# Patient Record
Sex: FEMALE | Race: WHITE | URBAN - METROPOLITAN AREA
[De-identification: names, ages, dates, MRNs, and addresses within clinical notes are randomized per-mention and may not be internally consistent; named-entity substitution may affect disease eponyms.]

---

## 2022-10-12 ENCOUNTER — OFFICE VISIT (OUTPATIENT)
Dept: DERMATOLOGY | Age: 57
End: 2022-10-12
Payer: MEDICARE

## 2022-10-12 VITALS
HEART RATE: 105 BPM | TEMPERATURE: 97.6 F | BODY MASS INDEX: 27.16 KG/M2 | SYSTOLIC BLOOD PRESSURE: 125 MMHG | WEIGHT: 163 LBS | OXYGEN SATURATION: 95 % | DIASTOLIC BLOOD PRESSURE: 89 MMHG | HEIGHT: 65 IN

## 2022-10-12 DIAGNOSIS — L65.8 FEMALE PATTERN ALOPECIA: ICD-10-CM

## 2022-10-12 DIAGNOSIS — L68.0 HIRSUTISM: Primary | ICD-10-CM

## 2022-10-12 DIAGNOSIS — Z79.899 ON POTASSIUM SPARING DIURETIC THERAPY: ICD-10-CM

## 2022-10-12 DIAGNOSIS — L82.1 SEBORRHEIC KERATOSIS: ICD-10-CM

## 2022-10-12 DIAGNOSIS — D22.9 MULTIPLE NEVI: ICD-10-CM

## 2022-10-12 PROCEDURE — 1036F TOBACCO NON-USER: CPT | Performed by: DERMATOLOGY

## 2022-10-12 PROCEDURE — G8427 DOCREV CUR MEDS BY ELIG CLIN: HCPCS | Performed by: DERMATOLOGY

## 2022-10-12 PROCEDURE — 3017F COLORECTAL CA SCREEN DOC REV: CPT | Performed by: DERMATOLOGY

## 2022-10-12 PROCEDURE — 99204 OFFICE O/P NEW MOD 45 MIN: CPT | Performed by: DERMATOLOGY

## 2022-10-12 PROCEDURE — G8484 FLU IMMUNIZE NO ADMIN: HCPCS | Performed by: DERMATOLOGY

## 2022-10-12 PROCEDURE — G8419 CALC BMI OUT NRM PARAM NOF/U: HCPCS | Performed by: DERMATOLOGY

## 2022-10-12 RX ORDER — INSULIN LISPRO 100 [IU]/ML
INJECTION, SOLUTION INTRAVENOUS; SUBCUTANEOUS
COMMUNITY

## 2022-10-12 RX ORDER — IBUPROFEN 800 MG/1
TABLET ORAL
COMMUNITY

## 2022-10-12 RX ORDER — INSULIN GLARGINE 100 [IU]/ML
INJECTION, SOLUTION SUBCUTANEOUS
COMMUNITY
Start: 2022-08-25

## 2022-10-12 RX ORDER — MULTIVITAMIN WITH IRON
8000 TABLET ORAL DAILY
COMMUNITY

## 2022-10-12 RX ORDER — SPIRONOLACTONE 50 MG/1
TABLET, FILM COATED ORAL
Qty: 60 TABLET | Refills: 0 | Status: SHIPPED | OUTPATIENT
Start: 2022-10-12

## 2022-10-12 RX ORDER — FAMOTIDINE 20 MG/1
TABLET, FILM COATED ORAL
COMMUNITY

## 2022-10-12 RX ORDER — ALPRAZOLAM 0.25 MG/1
TABLET ORAL
COMMUNITY
Start: 2022-08-15

## 2022-10-12 RX ORDER — QUETIAPINE FUMARATE 25 MG/1
TABLET, FILM COATED ORAL
COMMUNITY

## 2022-10-12 RX ORDER — DULOXETIN HYDROCHLORIDE 60 MG/1
CAPSULE, DELAYED RELEASE ORAL
COMMUNITY
Start: 2022-10-06

## 2022-10-12 RX ORDER — DULAGLUTIDE 3 MG/.5ML
INJECTION, SOLUTION SUBCUTANEOUS
COMMUNITY
Start: 2022-10-10

## 2022-10-12 RX ORDER — ASPIRIN 81 MG/1
81 TABLET ORAL DAILY
COMMUNITY

## 2022-10-12 NOTE — PROGRESS NOTES
Dermatology Patient Note  Deepak  [address]  [facility]  Dept: [phone]  Dept Fax: [phone]      VISITDATE: 10/12/2022   REFERRING PROVIDER: No ref. provider found      Crispin Ying is a 59 y.o. female  who presents today in the office for:    New Patient (New pt here for a FBSC-used to see . States that she has a hx of skin cancer right shoulder and had it shaved off. Pt states she also has hirtuism. She has hair growth on the chin area and thinning of the hair. She states that she would like to try spirololactolone )      HISTORY OF PRESENT ILLNESS:  As above. Patient has history of lesion removed on shoulder, thinks maybe pre-melanoma (atypical nevus?) She states she had to have two layers of skin removed. Patient reports that she saw Dr. Jelena Figueroa for removal. She states she had a mole removed between her toes but the biopsy returned benign. Hirsutism - has been a problem for 30+ years. She states her hormones have been checked for many years even after her hysterectomy. She reports she just found out the name of her condition from a TV show (Bad Hair Day) and that she has never had treatment for it. She states her mother and grandmother had whiskers but not to patient's extent. Has not tried spironolactone. She denies problems with blood pressure and kidney problems. She had laser hair removal and had 2-3 sessions. She reports that the area just blistered and did not improve her hirsutism. She has also had an electrical therapy performed which did not work. Also tried an at home laser therapy which did not work. Patient states she is type 1.5 diabetic and whenever she goes to the ER for diabetes her potassium is always low. She is following with an endocrinologist.     MEDICAL PROBLEMS:  There are no problems to display for this patient.       CURRENT MEDICATIONS:   Current Outpatient Medications   Medication Sig Dispense Refill    ALPRAZolam (XANAX) 0.25 MG tablet       aspirin 81 MG EC tablet Take 81 mg by mouth daily      vitamin D (CHOLECALCIFEROL) 25657 UNIT CAPS cholecalciferol (vitamin D3) 1,250 mcg (50,000 unit) capsule      ciclopirox (LOPROX) 0.77 % cream Loprox 0.77 % External Cream  APPLY THIN FILM TO AFFECTED AREA(S) ONCE DAILY. prn   Refills: 0  Active      Cyanocobalamin 1000 MCG SUBL Place 1,000 mcg under the tongue daily      TRULICITY 3 KI/3.8JH SOPN       DULoxetine (CYMBALTA) 60 MG extended release capsule       insulin lispro, 1 Unit Dial, (HUMALOG/ADMELOG) 100 UNIT/ML SOPN insulin lispro (U-100) 100 unit/mL subcutaneous pen      LANTUS SOLOSTAR 100 UNIT/ML injection pen       famotidine (PEPCID) 20 MG tablet famotidine 20 mg tablet      ibuprofen (ADVIL;MOTRIN) 800 MG tablet ibuprofen 800 mg tablet      QUEtiapine (SEROQUEL) 25 MG tablet quetiapine 25 mg tablet      Vitamin A 2400 MCG (8000 UT) CAPS Take 8,000 Units by mouth daily       No current facility-administered medications for this visit. ALLERGIES:   Allergies   Allergen Reactions    Dust Mite Extract      This is dust mites - updating for system screening from allergy originally recorded 8-4-2016    Lisinopril Cough       SOCIAL HISTORY:  Social History     Tobacco Use    Smoking status: Never    Smokeless tobacco: Never   Substance Use Topics    Alcohol use: Yes     Comment: on occ       Pertinent ROS:  Review of Systems  Skin: Denies any new changing, growing or bleeding lesions or rashes except as described in the HPI   Constitutional: Denies fevers, chills, and malaise.     PHYSICAL EXAM:   /89   Pulse (!) 105   Temp 97.6 °F (36.4 °C)   Ht 5' 5\" (1.651 m)   Wt 163 lb (73.9 kg)   SpO2 95%   BMI 27.12 kg/m²     The patient is generally well appearing, well nourished, alert and conversational. Affect is normal.    Cutaneous Exam:  Physical Exam  Total body skin exam excluding external genitalia: head/face, neck, both arms, chest, back, abdomen, both legs, buttocks, digits and/or nails, was examined. Genital exam was deferred as patient denied having any lesions in this area. Complete visualization of scalp may be limited by hair density, length, and/or style    Facial covering was removed during examination. Diagnoses/exam findings/medical history pertinent to this visit are listed below:    Assessment:   Diagnosis Orders   1. Hirsutism        2. Seborrheic keratosis        3. Female pattern alopecia             Plan:  Hirsutism  - chronic illness with progression and/or exacerbation   - discussed diagnosis, etiology, natural course, and treatment options   - start spironolactone 50 mg daily for 1 week, then 100 mg for rest of month  Discussed spironolactone with patient. The patient was informed of common side effects such as increased urination/urinary frequency, menstrual irregularities with mid-cycle spotting, breast tenderness, decreased libido, fatigue, headache, and dizziness. Patient informed to stop taking medication and to immediately report any new onset muscle cramps or weakness, or palpitations. Patient informed that pregnancy needs to be avoided while on this medication. Discussed risk of elevated potassium and the need to stay away from potassium supplements while on the medication. - check potassium in 2 weeks (after 1 week on 50 mg and 1 week on 100 mg). Potassium 9/28/2022 is normal  - counseled patient to keep diabetes under control to protect kidneys  - patient plans to use at home laser hair removal while on spironolactone    Female pattern alopecia  - spironolactone as above    Seborrheic keratoses of inframammary folds  - reassurance and education     History of skin cancer  - request records from Dr. Terry Morrison    [facility] 6 months    No future appointments.       Patient Instructions   - start spironolactone 50 mg daily for 1 week, then 100 mg for rest of month  - check potassium today in 2 weeks (after 1 week on 50 mg and 1 week on 100 mg)  Common side effects of spironolactone include increased urination, irregular periods with mid-cycle spotting, breast tenderness, decreased sex drive, fatigue, headache, and dizziness. Stop taking medication and to immediately report any new onset muscle cramps or weakness, or palpitations. Pregnancy needs to be avoided by use of birth control in order to prevent feminization of a male fetus. Stay away from potassium supplements while on the medication as there is a risk of high potassium levels. Thomas Lieberman, personally scribed the services dictated to me by Dr. Shana Garcia in this documentation. I, Dr. Shana Garcia, personally performed the services described in this documentation, as scribed by Vern Rodarte in my presence, and it is both accurate and complete.     Electronically signed by Criss Brown MD on 10/12/2022 at 9:46 PM

## 2022-10-12 NOTE — PATIENT INSTRUCTIONS
- start spironolactone 50 mg daily for 1 week, then 100 mg for rest of month  - check potassium today in 2 weeks (after 1 week on 50 mg and 1 week on 100 mg)  Common side effects of spironolactone include increased urination, irregular periods with mid-cycle spotting, breast tenderness, decreased sex drive, fatigue, headache, and dizziness. Stop taking medication and to immediately report any new onset muscle cramps or weakness, or palpitations. Pregnancy needs to be avoided by use of birth control in order to prevent feminization of a male fetus. Stay away from potassium supplements while on the medication as there is a risk of high potassium levels.

## 2022-10-21 ENCOUNTER — TELEPHONE (OUTPATIENT)
Dept: DERMATOLOGY | Age: 57
End: 2022-10-21

## 2022-10-21 NOTE — TELEPHONE ENCOUNTER
Patient needs instructions on how to take Aldactone.   She wants to know, should she take 2 pills at the same time or should she take one pill in the AM and one pill in the PM

## 2022-11-25 DIAGNOSIS — L68.0 HIRSUTISM: ICD-10-CM

## 2022-11-25 DIAGNOSIS — L65.8 FEMALE PATTERN ALOPECIA: ICD-10-CM

## 2022-11-28 RX ORDER — SPIRONOLACTONE 50 MG/1
TABLET, FILM COATED ORAL
Qty: 60 TABLET | Refills: 0 | Status: SHIPPED | OUTPATIENT
Start: 2022-11-28

## 2023-01-15 DIAGNOSIS — L68.0 HIRSUTISM: ICD-10-CM

## 2023-01-15 DIAGNOSIS — L65.8 FEMALE PATTERN ALOPECIA: ICD-10-CM

## 2023-01-16 RX ORDER — SPIRONOLACTONE 50 MG/1
TABLET, FILM COATED ORAL
Qty: 60 TABLET | Refills: 2 | Status: SHIPPED | OUTPATIENT
Start: 2023-01-16

## 2023-05-09 DIAGNOSIS — L68.0 HIRSUTISM: ICD-10-CM

## 2023-05-09 DIAGNOSIS — L65.8 FEMALE PATTERN ALOPECIA: ICD-10-CM

## 2023-05-09 RX ORDER — SPIRONOLACTONE 50 MG/1
TABLET, FILM COATED ORAL
Qty: 90 TABLET | Refills: 0 | OUTPATIENT
Start: 2023-05-09

## 2023-05-24 DIAGNOSIS — L65.8 FEMALE PATTERN ALOPECIA: ICD-10-CM

## 2023-05-24 DIAGNOSIS — L68.0 HIRSUTISM: ICD-10-CM

## 2023-05-25 ENCOUNTER — HOSPITAL ENCOUNTER (OUTPATIENT)
Age: 58
Setting detail: SPECIMEN
Discharge: HOME | End: 2023-05-25

## 2023-05-25 DIAGNOSIS — Z79.899 ON POTASSIUM SPARING DIURETIC THERAPY: ICD-10-CM

## 2023-05-25 LAB
ANION GAP SERPL CALCULATED.3IONS-SCNC: 14 MMOL/L (ref 9–17)
BUN SERPL-MCNC: 10 MG/DL (ref 6–20)
CALCIUM SERPL-MCNC: 10 MG/DL (ref 8.6–10.4)
CHLORIDE SERPL-SCNC: 102 MMOL/L (ref 98–107)
CO2 SERPL-SCNC: 24 MMOL/L (ref 20–31)
CREAT SERPL-MCNC: 0.82 MG/DL (ref 0.5–0.9)
GFR SERPL CREATININE-BSD FRML MDRD: >60 ML/MIN/1.73M2
GLUCOSE SERPL-MCNC: 203 MG/DL (ref 70–99)
POTASSIUM SERPL-SCNC: 4 MMOL/L (ref 3.7–5.3)
SODIUM SERPL-SCNC: 140 MMOL/L (ref 135–144)

## 2023-05-25 RX ORDER — SPIRONOLACTONE 50 MG/1
TABLET, FILM COATED ORAL
Qty: 90 TABLET | Refills: 0 | OUTPATIENT
Start: 2023-05-25

## 2023-05-26 DIAGNOSIS — L65.8 FEMALE PATTERN ALOPECIA: ICD-10-CM

## 2023-05-26 DIAGNOSIS — L68.0 HIRSUTISM: ICD-10-CM

## 2023-05-26 RX ORDER — SPIRONOLACTONE 50 MG/1
TABLET, FILM COATED ORAL
Qty: 90 TABLET | Refills: 5 | Status: SHIPPED | OUTPATIENT
Start: 2023-05-26

## 2023-10-12 ENCOUNTER — OFFICE VISIT (OUTPATIENT)
Dept: DERMATOLOGY | Age: 58
End: 2023-10-12
Payer: MEDICARE

## 2023-10-12 VITALS
HEART RATE: 114 BPM | BODY MASS INDEX: 27.16 KG/M2 | WEIGHT: 163.2 LBS | SYSTOLIC BLOOD PRESSURE: 113 MMHG | DIASTOLIC BLOOD PRESSURE: 81 MMHG | TEMPERATURE: 97.2 F | OXYGEN SATURATION: 97 %

## 2023-10-12 DIAGNOSIS — L29.9 PRURITUS: ICD-10-CM

## 2023-10-12 DIAGNOSIS — L65.9 ALOPECIA: ICD-10-CM

## 2023-10-12 DIAGNOSIS — D48.5 NEOPLASM OF UNCERTAIN BEHAVIOR OF SCALP: ICD-10-CM

## 2023-10-12 DIAGNOSIS — L81.4 SOLAR LENTIGO: ICD-10-CM

## 2023-10-12 DIAGNOSIS — L65.8 FEMALE PATTERN ALOPECIA: ICD-10-CM

## 2023-10-12 DIAGNOSIS — L21.9 SEBORRHEIC DERMATITIS: ICD-10-CM

## 2023-10-12 DIAGNOSIS — L68.0 HIRSUTISM: Primary | ICD-10-CM

## 2023-10-12 PROCEDURE — 11104 PUNCH BX SKIN SINGLE LESION: CPT | Performed by: DERMATOLOGY

## 2023-10-12 PROCEDURE — 99212 OFFICE O/P EST SF 10 MIN: CPT | Performed by: DERMATOLOGY

## 2023-10-12 RX ORDER — SPIRONOLACTONE 50 MG/1
TABLET, FILM COATED ORAL
Qty: 90 TABLET | Refills: 2 | Status: SHIPPED | OUTPATIENT
Start: 2023-10-12

## 2023-10-12 RX ORDER — KETOCONAZOLE 20 MG/ML
SHAMPOO TOPICAL
Qty: 120 ML | Refills: 2 | Status: SHIPPED | OUTPATIENT
Start: 2023-10-12

## 2023-10-12 RX ORDER — LIDOCAINE HYDROCHLORIDE AND EPINEPHRINE 10; 10 MG/ML; UG/ML
0.9 INJECTION, SOLUTION INFILTRATION; PERINEURAL ONCE
Status: SHIPPED | OUTPATIENT
Start: 2023-10-12

## 2023-10-12 RX ORDER — LAMOTRIGINE 25 MG/1
TABLET ORAL
COMMUNITY
Start: 2023-09-15

## 2023-10-12 RX ORDER — ATORVASTATIN CALCIUM 40 MG/1
TABLET, FILM COATED ORAL
COMMUNITY
Start: 2023-08-03

## 2023-10-12 NOTE — PROGRESS NOTES
by a pathologist.  When a biopsy is done, there is a small wound site that requires proper care to prevent infection and scarring. Some biopsies require sutures and their removal.    How to Care for Biopsy Wound    A.  Leave band-aid or dressing on for 24 hours. B. Wash two times a day with soap and water. C.  Let the wound air dry, then apply Vaseline ointment and cover with a Band-Aid       unless otherwise instructed by your provider. D. If there is slight discomfort, you may give acetaminophen or ibuprofen. When To Call the Doctor    Call the Dermatology Clinic or your doctor if any of the following occur:    A. Redness and swelling  B. Tenderness and warm to touch  C.  Drainage from wound  D. Fever    Biopsy Results    Biopsy results are usually available in 1-2 weeks. We provide biopsy results in letters or via [email or web address] for benign results or we will call for any concerning results. If you have not heard from our staff please call the office within 2 weeks. Please call our office with any concerns at [phone]. Julio Rojo, personally scribed the services dictated to me by Dr. Norma Valerio in this documentation. I, Dr. Norma Valerio, personally performed the services described in this documentation, as scribed by Tab Leon in my presence, and it is both accurate and complete.

## 2023-10-12 NOTE — PATIENT INSTRUCTIONS

## 2023-10-13 ENCOUNTER — HOSPITAL ENCOUNTER (OUTPATIENT)
Age: 58
Setting detail: SPECIMEN
Discharge: HOME | End: 2023-10-13

## 2023-10-16 ENCOUNTER — NURSE ONLY (OUTPATIENT)
Dept: LAB | Age: 58
End: 2023-10-16

## 2023-10-19 ENCOUNTER — NURSE ONLY (OUTPATIENT)
Dept: DERMATOLOGY | Age: 58
End: 2023-10-19

## 2023-10-19 VITALS — TEMPERATURE: 97.2 F | BODY MASS INDEX: 28 KG/M2 | WEIGHT: 164 LBS | HEIGHT: 64 IN

## 2023-10-19 DIAGNOSIS — L29.9 PRURITUS: Primary | ICD-10-CM

## 2023-10-19 DIAGNOSIS — Z48.02 ENCOUNTER FOR REMOVAL OF SUTURES: Primary | ICD-10-CM

## 2023-10-19 PROCEDURE — 99024 POSTOP FOLLOW-UP VISIT: CPT | Performed by: DERMATOLOGY

## 2023-10-19 RX ORDER — FLUOCINONIDE TOPICAL SOLUTION USP, 0.05% 0.5 MG/ML
SOLUTION TOPICAL
Qty: 60 ML | Refills: 2 | Status: SHIPPED | OUTPATIENT
Start: 2023-10-19

## 2023-10-19 NOTE — PROGRESS NOTES
Bryce Garcia presented for suture removal on the scalp. The biopsy site was well healed. The suture was removed and a band-aid applied. The patient left in good condition.

## 2024-02-19 ENCOUNTER — HOSPITAL ENCOUNTER (OUTPATIENT)
Age: 59
Setting detail: SPECIMEN
Discharge: HOME | End: 2024-02-19

## 2024-02-19 ENCOUNTER — OFFICE VISIT (OUTPATIENT)
Dept: DERMATOLOGY | Age: 59
End: 2024-02-19
Payer: MEDICARE

## 2024-02-19 VITALS
DIASTOLIC BLOOD PRESSURE: 87 MMHG | HEIGHT: 64 IN | SYSTOLIC BLOOD PRESSURE: 129 MMHG | HEART RATE: 104 BPM | OXYGEN SATURATION: 98 % | TEMPERATURE: 98.3 F | BODY MASS INDEX: 27.66 KG/M2 | WEIGHT: 162 LBS

## 2024-02-19 DIAGNOSIS — L21.9 SEBORRHEIC DERMATITIS, UNSPECIFIED: ICD-10-CM

## 2024-02-19 DIAGNOSIS — L29.8 OTHER PRURITUS: ICD-10-CM

## 2024-02-19 DIAGNOSIS — L21.9 SEBORRHEIC DERMATITIS: ICD-10-CM

## 2024-02-19 DIAGNOSIS — Z01.89 ENCOUNTER FOR OTHER SPECIFIED SPECIAL EXAMINATIONS: ICD-10-CM

## 2024-02-19 DIAGNOSIS — Z79.899 ON POTASSIUM SPARING DIURETIC THERAPY: ICD-10-CM

## 2024-02-19 DIAGNOSIS — L82.1 SEBORRHEIC KERATOSES: ICD-10-CM

## 2024-02-19 DIAGNOSIS — R20.8 SKIN PAIN: ICD-10-CM

## 2024-02-19 DIAGNOSIS — L64.9 ANDROGENETIC ALOPECIA: Primary | ICD-10-CM

## 2024-02-19 DIAGNOSIS — L28.2 OTHER PRURIGO: ICD-10-CM

## 2024-02-19 DIAGNOSIS — L57.0 ACTINIC KERATOSES: ICD-10-CM

## 2024-02-19 DIAGNOSIS — L68.0 HIRSUTISM: ICD-10-CM

## 2024-02-19 LAB
ANION GAP SERPL CALCULATED.3IONS-SCNC: 15 MMOL/L (ref 9–16)
BUN SERPL-MCNC: 19 MG/DL (ref 8–23)
CALCIUM SERPL-MCNC: 10.1 MG/DL (ref 8.6–10.4)
CHLORIDE SERPL-SCNC: 101 MMOL/L (ref 98–107)
CO2 SERPL-SCNC: 20 MMOL/L (ref 20–31)
CREAT SERPL-MCNC: 1 MG/DL (ref 0.5–0.9)
GFR SERPL CREATININE-BSD FRML MDRD: >60 ML/MIN/1.73M2
GLUCOSE SERPL-MCNC: 211 MG/DL (ref 74–99)
POTASSIUM SERPL-SCNC: 4.7 MMOL/L (ref 3.7–5.3)
SODIUM SERPL-SCNC: 136 MMOL/L (ref 136–145)

## 2024-02-19 PROCEDURE — 99214 OFFICE O/P EST MOD 30 MIN: CPT | Performed by: DERMATOLOGY

## 2024-02-19 PROCEDURE — 17000 DESTRUCT PREMALG LESION: CPT | Performed by: DERMATOLOGY

## 2024-02-19 RX ORDER — MINOXIDIL 2 %
SOLUTION, NON-ORAL TOPICAL
Qty: 60 G | Refills: 3 | Status: CANCELLED | OUTPATIENT
Start: 2024-02-19

## 2024-02-19 NOTE — PATIENT INSTRUCTIONS
- start Minoxidil 5% 1-2 daily. Counseled appropriate use. Will take up to 6 months to see results. May have initial telogen effluvium. Results will be reversed if use is discontinued. Switch to women's strength (2%) if increased facial hair growth noted.  - complete ordered labs   - continue spironolactone 150 mg QD  - continue fluocinonide solution daily to pruritic areas   - continue hydrocortisone 2.5% cream for facial pruritus     How To Use Minoxidil (Rogaine)  Use men’s strength: 5%   Once or twice/day  Much better to use regularly once a day than inconsistently every other day   Only apply medication where you actually want your hair to grow  Can cause hair to grow on face, neck, hands, etc. if not washed off  Tips for application: wear gloves or wash hands with soap and water immediately     Important things to know:  Need to use for ~8-12 months to see effects  Medication must be used consistently and continuously for a sustained benefit   May notice some hair loss when starting medication   This is due to the medication converting the hair from a “weaker” hair into a “stronger” hair  Will notice hair loss when medication is stopped   Will lose only newly grown hair gained from using medicine  Will NOT “speed up” hair loss, normal loss starts again    Cheapest place to buy Minoxidil is [facility]   ~$50 = 6 month supply       Sun Protection     There are two types of sun rays that are harmful to the skin.  UVA rays cause skin aging and skin cancer, such as melanoma.  UVB rays cause sunburns, cataracts, and also contribute to skin cancer.    The American-Academy of Dermatology recommends that children and adults wear a broad spectrum, waterproof sunscreen with a Sun Protection Factor (SPF) of 30 or higher.  It is important to check the ingredient label to be sure the sunscreen will protect the skin from both UVA and UVB sunrays.  Your sunscreen should contain at least one of the following ingredients: titanium

## 2024-02-19 NOTE — PROGRESS NOTES
Dermatology Patient Note  [facility], [facility] DERMATOLOGY  [facility]  [address]  [facility] [address]  Dept: [phone]  Dept Fax: [phone]      VISITDATE: 2/19/2024   REFERRING PROVIDER: No ref. provider found      Caity Jones is a 58 y.o. female  who presents today in the office for:    Other (Pt here for an alopecia follow up- states she stopped the dustaeride a week after she was prescribed it back in December due to having an allergic reaction to it. States broke out in a all over rash. She is taking the barrett, lidex sol-noticing an improvement in the hair growth. States in the last two months she's had a \"weird skin sensation like she has a sun burn\" and can't stand her clothes touching her skin. Started around the time she took the dutasteride. )      HISTORY OF PRESENT ILLNESS:  Patient presents for 2 month hirsutism and alopecia follow up. At  she was continued on spironolactone, fluocinonide solution, and hydrocortisone cream and started on Dutasteride 0.5 tiw.     At today's appointment the patient reports she had to stop the Dutasteride after 1 week due to an allergic rxn causing widespread hives. She has some new growth and increased thickness on the crown. She continues to be compliant with spironolactone, ketoconazole, lidex solution and hydrocortisone. She has never used rogaine due to hirsutism.     During the past 2 months she has had an odd skin sensitivity she describes to be like a sun burn. It Is on her upper ext, back, and near the bilateral ankles. She does not like the feeling of clothing touching her skin. It is different compared to the pruritus she has felt on the scalp and face. She has diabetes and is currently better controlled than she was a year ago. The patient takes lamictal for bipolar disorder (~2 years) and duloxetine for depression. She was on Gabapentin in the past but stopped due to fatigue.      MEDICAL

## 2024-04-02 DIAGNOSIS — L65.8 FEMALE PATTERN ALOPECIA: ICD-10-CM

## 2024-04-02 DIAGNOSIS — L68.0 HIRSUTISM: ICD-10-CM

## 2024-04-03 RX ORDER — SPIRONOLACTONE 50 MG/1
TABLET, FILM COATED ORAL
Qty: 90 TABLET | Refills: 2 | Status: SHIPPED | OUTPATIENT
Start: 2024-04-03

## 2024-04-10 ENCOUNTER — HOSPITAL ENCOUNTER (OUTPATIENT)
Age: 59
Setting detail: SPECIMEN
Discharge: HOME | End: 2024-04-10

## 2024-04-10 DIAGNOSIS — L21.9 SEBORRHEIC DERMATITIS, UNSPECIFIED: ICD-10-CM

## 2024-04-10 DIAGNOSIS — Z01.89 ENCOUNTER FOR OTHER SPECIFIED SPECIAL EXAMINATIONS: ICD-10-CM

## 2024-04-10 DIAGNOSIS — L29.8 OTHER PRURITUS: ICD-10-CM

## 2024-04-10 DIAGNOSIS — L28.2 OTHER PRURIGO: ICD-10-CM

## 2024-04-10 DIAGNOSIS — R20.8 SKIN PAIN: ICD-10-CM

## 2024-04-10 LAB
BASOPHILS # BLD: 0.08 K/UL (ref 0–0.2)
BASOPHILS NFR BLD: 1 % (ref 0–2)
C PEPTIDE SERPL-MCNC: 1.3 NG/ML (ref 1.1–4.4)
EOSINOPHIL # BLD: 0.25 K/UL (ref 0–0.44)
EOSINOPHILS RELATIVE PERCENT: 3 % (ref 1–4)
ERYTHROCYTE [DISTWIDTH] IN BLOOD BY AUTOMATED COUNT: 12.9 % (ref 11.8–14.4)
FERRITIN SERPL-MCNC: 148 NG/ML (ref 13–150)
FOLATE SERPL-MCNC: 11.8 NG/ML (ref 4.8–24.2)
GLUCOSE P FAST SERPL-MCNC: 170 MG/DL (ref 74–99)
HAV IGM SERPL QL IA: NONREACTIVE
HBV CORE IGM SERPL QL IA: NONREACTIVE
HBV SURFACE AG SERPL QL IA: NONREACTIVE
HCT VFR BLD AUTO: 38.5 % (ref 36.3–47.1)
HCV AB SERPL QL IA: NONREACTIVE
HGB BLD-MCNC: 13 G/DL (ref 11.9–15.1)
HIV 1+2 AB+HIV1 P24 AG SERPL QL IA: NONREACTIVE
IMM GRANULOCYTES # BLD AUTO: <0.03 K/UL (ref 0–0.3)
IMM GRANULOCYTES NFR BLD: 0 %
IRON SATN MFR SERPL: 21 % (ref 20–55)
IRON SERPL-MCNC: 68 UG/DL (ref 37–145)
LYMPHOCYTES NFR BLD: 2.49 K/UL (ref 1.1–3.7)
LYMPHOCYTES RELATIVE PERCENT: 31 % (ref 24–43)
MCH RBC QN AUTO: 29.8 PG (ref 25.2–33.5)
MCHC RBC AUTO-ENTMCNC: 33.8 G/DL (ref 28.4–34.8)
MCV RBC AUTO: 88.3 FL (ref 82.6–102.9)
MONOCYTES NFR BLD: 0.55 K/UL (ref 0.1–1.2)
MONOCYTES NFR BLD: 7 % (ref 3–12)
NEUTROPHILS NFR BLD: 58 % (ref 36–65)
NEUTS SEG NFR BLD: 4.77 K/UL (ref 1.5–8.1)
NRBC BLD-RTO: 0 PER 100 WBC
PLATELET # BLD AUTO: 356 K/UL (ref 138–453)
PMV BLD AUTO: 10.1 FL (ref 8.1–13.5)
RBC # BLD AUTO: 4.36 M/UL (ref 3.95–5.11)
TIBC SERPL-MCNC: 320 UG/DL (ref 250–450)
TSH SERPL DL<=0.05 MIU/L-ACNC: 1.51 UIU/ML (ref 0.27–4.2)
UNSATURATED IRON BINDING CAPACITY: 252 UG/DL (ref 112–347)
VIT B12 SERPL-MCNC: 649 PG/ML (ref 232–1245)
WBC OTHER # BLD: 8.2 K/UL (ref 3.5–11.3)

## 2024-04-11 LAB
ALBUMIN SERPL-MCNC: 4.5 G/DL (ref 3.5–5.2)
ALBUMIN/GLOB SERPL: NORMAL {RATIO} (ref 1–2.5)
ALP SERPL-CCNC: 85 U/L (ref 35–104)
ALT SERPL-CCNC: 21 U/L (ref 10–35)
AST SERPL-CCNC: 22 U/L (ref 10–35)
BILIRUB DIRECT SERPL-MCNC: <0.2 MG/DL (ref 0–0.3)
BILIRUB INDIRECT SERPL-MCNC: 0.2 MG/DL (ref 0–1)
BILIRUB SERPL-MCNC: 0.3 MG/DL (ref 0–1.2)
GLOBULIN SER CALC-MCNC: NORMAL G/DL
PROT SERPL-MCNC: 6.6 G/DL (ref 6.6–8.7)

## 2024-04-12 LAB
ALBUMIN PERCENT: 66 % (ref 45–65)
ALBUMIN SERPL-MCNC: 4.3 G/DL (ref 3.2–5.2)
ALPHA 2 PERCENT: 12 % (ref 6–13)
ALPHA1 GLOB SERPL ELPH-MCNC: 0.2 G/DL (ref 0.1–0.4)
ALPHA1 GLOB SERPL ELPH-MCNC: 3 % (ref 3–6)
ALPHA2 GLOB SERPL ELPH-MCNC: 0.8 G/DL (ref 0.5–0.9)
ANA SER QL IA: NEGATIVE
B-GLOBULIN SERPL ELPH-MCNC: 0.8 G/DL (ref 0.7–1.4)
B-GLOBULIN SERPL ELPH-MCNC: 12 % (ref 11–19)
DSDNA IGG SER QL IA: <0.5 IU/ML
GAMMA GLOB SERPL ELPH-MCNC: 0.5 G/DL (ref 0.5–1.5)
GAMMA GLOBULIN %: 8 % (ref 9–20)
INTERPRETATION SERPL IFE-IMP: NORMAL
NUCLEAR IGG SER IA-RTO: <0.1 U/ML
PATH REV: NORMAL
PATHOLOGIST: ABNORMAL
PROT PATTERN SERPL ELPH-IMP: ABNORMAL
PROT SERPL-MCNC: 6.6 G/DL (ref 6.6–8.7)
TOTAL PROT. SUM,%: 101 % (ref 98–102)
TOTAL PROT. SUM: 6.6 G/DL (ref 6.3–8.2)

## 2024-06-10 DIAGNOSIS — L21.9 SEBORRHEIC DERMATITIS: ICD-10-CM

## 2024-06-11 RX ORDER — KETOCONAZOLE 20 MG/ML
SHAMPOO TOPICAL
Qty: 120 ML | Refills: 2 | Status: SHIPPED | OUTPATIENT
Start: 2024-06-11

## 2024-08-08 NOTE — PROGRESS NOTES
Dermatology Patient Note  [facility], [facility] DERMATOLOGY  formerly [facility]  [address]  [facility] [address]  Dept: [phone]  Dept Fax: [phone]      VISITDATE: 8/19/2024   REFERRING PROVIDER: No ref. provider found      Caity Jones is a 59 y.o. female  who presents today in the office for:    Other (Patient presents today for a six month fbse. No specific concerns today. )      HISTORY OF PRESENT ILLNESS:  Patient presents for 6 month FBSE and alopecia. She states that she had a lesion removed on the right shoulder by Dr. Kennedy in the past. She is unsure of what this lesion was but knows that she had to return to have additional skin removed after the initial biopsy. At  on 2/19/24, 1 AK on the nose was treated with cryotherapy. She was to continue spironolactone 150 mg daily, fluocinonide solution daily, hydrocortisone 2.5% cream daily, and start minoxidil 5% 1-2x daily. Labs were ordered for skin dysesthesia and were normal. Referral to neurology was in reserve.     She denies specific areas of concern today aside from new scaly patches on the face. She notes that her hirsutism has not improved with the current regimen and she has not yet tried the topical minoxidil for androgenetic alopecia. She has been using spironolactone without SE. Last potassium was measured in February 2024. She does not take oral birth control as she underwent a total hysterectomy. She has not tolerated hormonal therapy well in the past.     She has persistent neuropathy/burning of the skin of her arms and back. She has a pump in place which has improved her A1C.     MEDICAL PROBLEMS:  There are no problems to display for this patient.      CURRENT MEDICATIONS:   Current Outpatient Medications   Medication Sig Dispense Refill    ketoconazole (NIZORAL) 2 % shampoo APPLY 3-4 TIMES WEEKLY TO SCALP, LEAVE ON FOR 5 MINUTES PRIOR TO WASHING  mL 2    spironolactone

## 2024-08-19 ENCOUNTER — OFFICE VISIT (OUTPATIENT)
Dept: DERMATOLOGY | Age: 59
End: 2024-08-19
Payer: MEDICARE

## 2024-08-19 VITALS
HEART RATE: 90 BPM | WEIGHT: 163 LBS | DIASTOLIC BLOOD PRESSURE: 81 MMHG | SYSTOLIC BLOOD PRESSURE: 121 MMHG | BODY MASS INDEX: 27.83 KG/M2 | HEIGHT: 64 IN | TEMPERATURE: 97.7 F | OXYGEN SATURATION: 97 %

## 2024-08-19 DIAGNOSIS — L57.8 ACTINIC SKIN DAMAGE: ICD-10-CM

## 2024-08-19 DIAGNOSIS — L82.0 INFLAMED SEBORRHEIC KERATOSIS: ICD-10-CM

## 2024-08-19 DIAGNOSIS — L68.0 HIRSUTISM: ICD-10-CM

## 2024-08-19 DIAGNOSIS — R20.8 SKIN PAIN: ICD-10-CM

## 2024-08-19 DIAGNOSIS — L82.1 SEBORRHEIC KERATOSES: Primary | ICD-10-CM

## 2024-08-19 DIAGNOSIS — L64.9 ANDROGENETIC ALOPECIA: ICD-10-CM

## 2024-08-19 DIAGNOSIS — L29.9 PRURITUS: ICD-10-CM

## 2024-08-19 DIAGNOSIS — Z79.899 ON POTASSIUM SPARING DIURETIC THERAPY: ICD-10-CM

## 2024-08-19 PROCEDURE — 99213 OFFICE O/P EST LOW 20 MIN: CPT | Performed by: DERMATOLOGY

## 2024-08-19 PROCEDURE — 17110 DESTRUCTION B9 LES UP TO 14: CPT | Performed by: DERMATOLOGY

## 2024-08-19 RX ORDER — SPIRONOLACTONE 50 MG/1
TABLET, FILM COATED ORAL
Qty: 90 TABLET | Refills: 2 | Status: SHIPPED | OUTPATIENT
Start: 2024-08-19

## 2024-08-19 RX ORDER — FLUOCINONIDE TOPICAL SOLUTION USP, 0.05% 0.5 MG/ML
SOLUTION TOPICAL
Qty: 60 ML | Refills: 2 | Status: CANCELLED | OUTPATIENT
Start: 2024-08-19

## 2024-08-19 NOTE — PATIENT INSTRUCTIONS
Recommended that she try capsaicin cream on affected areas for nerve itching/pain.     Recommended that she consider use of a diaper rash paste (such as triple paste) on the skin folds for irritation due to sweating.     For alopecia + hirsutism:  - complete labs as ordered   - continue spironolactone 150 mg QD  - continue fluocinonide solution daily to pruritic areas   - continue hydrocortisone 2.5% cream for facial pruritus   - start topical Minoxidil 2% 1-2 daily. Counseled appropriate use. Will take up to 6 months to see results. May have initial telogen effluvium. Results will be reversed if use is discontinued.     Sun Protection     There are two types of sun rays that are harmful to the skin.  UVA rays cause skin aging and skin cancer, such as melanoma.  UVB rays cause sunburns, cataracts, and also contribute to skin cancer.    The American-Academy of Dermatology recommends that children and adults wear a broad spectrum, waterproof sunscreen with a Sun Protection Factor (SPF) of 30 or higher.  It is important to check the ingredient label to be sure the sunscreen will protect the skin from both UVA and UVB sunrays.  Your sunscreen should contain at least one of the following ingredients: titanium dioxide, zinc oxide, or avobenzone.    Sunscreen will not be effective unless it is applied to all exposed skin.  Sunscreens work best if they are applied 30 minutes before sun exposure.  They should be reapplied every 2 hours and after any water exposure.    Sunscreen is not perfect.  It is important to use other methods to protect the skin from sun exposure also.  Wear hats, sunglasses and other sun protective clothing when outdoors.  Stay in the shade during the peak hours of sun exposure between 10 AM and 4 PM.    Cryotherapy    Liquid Nitrogen - \"freeze\" (Cryotherapy)  Your doctor has treated your skin lesions with a very cold substance.  The liquid nitrogen is so cold that it may feel like the skin is burning

## 2024-08-21 ENCOUNTER — HOSPITAL ENCOUNTER (OUTPATIENT)
Age: 59
Setting detail: SPECIMEN
Discharge: HOME | End: 2024-08-21

## 2024-08-21 DIAGNOSIS — Z79.899 ON POTASSIUM SPARING DIURETIC THERAPY: ICD-10-CM

## 2024-08-21 LAB — POTASSIUM SERPL-SCNC: 4.4 MMOL/L (ref 3.7–5.3)

## 2024-11-30 ENCOUNTER — HOSPITAL ENCOUNTER (EMERGENCY)
Facility: CLINIC | Age: 59
Discharge: HOME | End: 2024-11-30
Attending: EMERGENCY MEDICINE
Payer: MEDICARE

## 2024-11-30 VITALS
TEMPERATURE: 98.2 F | OXYGEN SATURATION: 98 % | BODY MASS INDEX: 27.66 KG/M2 | RESPIRATION RATE: 17 BRPM | HEART RATE: 120 BPM | WEIGHT: 162 LBS | HEIGHT: 64 IN | DIASTOLIC BLOOD PRESSURE: 84 MMHG | SYSTOLIC BLOOD PRESSURE: 124 MMHG

## 2024-11-30 DIAGNOSIS — H66.003 NON-RECURRENT ACUTE SUPPURATIVE OTITIS MEDIA OF BOTH EARS WITHOUT SPONTANEOUS RUPTURE OF TYMPANIC MEMBRANES: Primary | ICD-10-CM

## 2024-11-30 DIAGNOSIS — J01.40 ACUTE NON-RECURRENT PANSINUSITIS: ICD-10-CM

## 2024-11-30 LAB
FLUAV AG SPEC QL: NEGATIVE
FLUBV AG SPEC QL: NEGATIVE
SARS-COV-2 RDRP RESP QL NAA+PROBE: NOT DETECTED
SPECIMEN DESCRIPTION: NORMAL
SPECIMEN SOURCE: NORMAL
STREP A, MOLECULAR: NEGATIVE

## 2024-11-30 PROCEDURE — 96372 THER/PROPH/DIAG INJ SC/IM: CPT

## 2024-11-30 PROCEDURE — 87635 SARS-COV-2 COVID-19 AMP PRB: CPT

## 2024-11-30 PROCEDURE — 87804 INFLUENZA ASSAY W/OPTIC: CPT

## 2024-11-30 PROCEDURE — 87651 STREP A DNA AMP PROBE: CPT

## 2024-11-30 PROCEDURE — 6360000002 HC RX W HCPCS

## 2024-11-30 PROCEDURE — 99284 EMERGENCY DEPT VISIT MOD MDM: CPT

## 2024-11-30 RX ORDER — DEXAMETHASONE SODIUM PHOSPHATE 10 MG/ML
10 INJECTION, SOLUTION INTRAMUSCULAR; INTRAVENOUS ONCE
Status: COMPLETED | OUTPATIENT
Start: 2024-11-30 | End: 2024-11-30

## 2024-11-30 RX ORDER — KETOROLAC TROMETHAMINE 30 MG/ML
30 INJECTION, SOLUTION INTRAMUSCULAR; INTRAVENOUS ONCE
Status: COMPLETED | OUTPATIENT
Start: 2024-11-30 | End: 2024-11-30

## 2024-11-30 RX ADMIN — KETOROLAC TROMETHAMINE 30 MG: 30 INJECTION, SOLUTION INTRAMUSCULAR at 13:05

## 2024-11-30 RX ADMIN — DEXAMETHASONE SODIUM PHOSPHATE 10 MG: 10 INJECTION, SOLUTION INTRAMUSCULAR; INTRAVENOUS at 13:01

## 2024-11-30 ASSESSMENT — ENCOUNTER SYMPTOMS
SINUS PAIN: 1
RHINORRHEA: 1
SINUS PRESSURE: 1
SORE THROAT: 1

## 2024-11-30 ASSESSMENT — LIFESTYLE VARIABLES
HOW MANY STANDARD DRINKS CONTAINING ALCOHOL DO YOU HAVE ON A TYPICAL DAY: PATIENT DOES NOT DRINK
HOW OFTEN DO YOU HAVE A DRINK CONTAINING ALCOHOL: NEVER

## 2024-11-30 NOTE — ED PROVIDER NOTES
[facility]  EMERGENCY DEPARTMENT ENCOUNTER      Pt Name: Caity Jones  MRN: [id number]  Birthdate 1965  Date of evaluation: 11/30/2024  Provider: HINA Salas NP  1:49 PM    CHIEF COMPLAINT       Chief Complaint   Patient presents with    Pharyngitis    Ear Pain    Headache         HISTORY OF PRESENT ILLNESS    Caity Jones is a 59 y.o. female who presents to the emergency department     59-year-old female complaining of a sore throat, bilateral ear pain, headache for approximately 3 days.  She also reports a cough, body aches.  She reports a runny nose and severe postnasal drip.  She denies any fevers however she does report chills.  She is currently afebrile.  She is nontoxic, stable, ambulatory.  Denies any chest pain or shortness of breath.  Denies any nausea or vomiting.  Denies any constipation or diarrhea.  Denies any lower urinary tract symptoms.    The history is provided by the patient.       Nursing Notes were reviewed.    REVIEW OF SYSTEMS       Review of Systems   HENT:  Positive for ear pain, postnasal drip, rhinorrhea, sinus pressure, sinus pain and sore throat.        Except as noted above the remainder of the review of systems was reviewed and negative.       PAST MEDICAL HISTORY   No past medical history on file.      SURGICAL HISTORY     No past surgical history on file.      CURRENT MEDICATIONS       Previous Medications    ALPRAZOLAM (XANAX) 0.25 MG TABLET        ASPIRIN 81 MG EC TABLET    Take 1 tablet by mouth daily    ATORVASTATIN (LIPITOR) 40 MG TABLET        CICLOPIROX (LOPROX) 0.77 % CREAM        CYANOCOBALAMIN 1000 MCG SUBL    Place 1,000 mcg under the tongue daily    DULOXETINE (CYMBALTA) 60 MG EXTENDED RELEASE CAPSULE        FAMOTIDINE (PEPCID) 20 MG TABLET        FLUOCINONIDE (LIDEX) 0.05 % EXTERNAL SOLUTION    Apply to scalp daily    HYDROCORTISONE 2.5 % CREAM    Apply to face twice daily    IBUPROFEN (ADVIL;MOTRIN) 800 MG TABLET        INSULIN LISPRO, 1  NEGATIVE  Strep screen was negative []   1333 Rapid influenza A/B antigens:    Flu A Antigen NEGATIVE   Flu B Antigen NEGATIVE  Influenza negative []      ED Course User Index  [PARKER] Zia Mehta APRN - NP     Patient was swabbed for strep, influenza, COVID.  All of her swabs were negative.  Her physical exam was consistent with findings of a infectious sinusitis and bilateral otitis media.  I discussed treatment options with her up to and including antibiotic use.  She is agreeable to try some Augmentin.  I did send in a prescription for her.  I did report that it may take up to 48 to 72 hours for significant improvement.  I did stress the importance to finish out the entire antibiotic.  And to follow-up with her primary care physician on Monday.  The patient is agreeable with this plan of care and discharge at this time.    CRITICAL CARE TIME       CONSULTS:  None    PROCEDURES:  Unless otherwise noted below, none     Procedures        FINAL IMPRESSION      1. Non-recurrent acute suppurative otitis media of both ears without spontaneous rupture of tympanic membranes    2. Acute non-recurrent pansinusitis          DISPOSITION/PLAN   DISPOSITION Decision To Discharge 11/30/2024 01:33:40 PM           PATIENT REFERRED TO:  Mesha Baez MD  [address] [place] [address] [address]  [facility] [address]  [phone]    Schedule an appointment as soon as possible for a visit in 2 days        DISCHARGE MEDICATIONS:  New Prescriptions    AMOXICILLIN-CLAVULANATE (AUGMENTIN) 875-125 MG PER TABLET    Take 1 tablet by mouth 2 times daily for 7 days     Controlled Substances Monitoring:     RX Monitoring Periodic Controlled Substance Monitoring   4/7/2015  12:13 AM No signs of potential drug abuse or diversion identified.       (Please note that portions of this note were completed with a voice recognition program.  Efforts were made to edit the dictations but occasionally words are mis-transcribed.)    HINA Salas -

## 2024-11-30 NOTE — DISCHARGE INSTRUCTIONS
Call today or tomorrow to follow up with Mesha Baez MD  in 2 days.    Use over the counter nasal spray every 2 - 3 hours to both nostrils to keep your sinuses clean.  Obtain over the counter medication called guaifenesin (Robitussen / Mucinex) to help clear out the drainage.  Use any over the counter sinus medication (claritan / loratidine / zyrtec)    Nonprescription saline nasal sprays and nose drops can be used to keep your nasal tissues moist, relieve nasal irritation and help thick or dried mucus to drain.  Saline nose drops can be purchased over the counter or can be made easily at home:    Step 1 - Boil tap water for 10 minutes to remove all of the impurities and get the water hot enough to dissolve the salt. Pour one cup of the boiling hot water into a liquid measuring cup with a spout (to pour easily later).  Step 2 - Add one teaspoon of baking soda and one teaspoon of sea salt to the boiling water in the measuring cup. Stir until the water is completely clear again, which insures that the baking soda and salt dissolve completely.  Step 3 - Allow the water to cool to room temperature. Then, pour the water into a clean nasal spray bottle (available at [employer]).  While lying on the bed with your head hanging over the side of the bed, squeeze the saline nasal spray one to three times into each nostril, inhaling through the nose as you squeeze in the fluid.  If you do not have a dropper, then use a bulb syringe and gently squirt the solution into your nose, or snuff the solution from the palm of your hand, one nostril at a time.  Step 4 - Store the remaining saline nasal spray in glass jar or plastic container with a lid. Refill your nasal spray bottle as needed.  Make a fresh solution every 3 days.    Use the saline spray, or obtain a humidifier for your bed room.    Take your medication as indicated, if you are given an antibiotic then make sure you get the prescription filled and take the antibiotics

## 2024-11-30 NOTE — ED PROVIDER NOTES
[facility]  eMERGENCY dEPARTMENT eNCOUnter   Independent Attestation     Pt Name: Caity Jones  MRN: [id number]  Birthdate 1965  Date of evaluation: 11/30/24       Caity Jones is a 59 y.o. female who presents with Pharyngitis, Ear Pain, and Headache        Based on the medical record, the care appears appropriate. I was personally available for consultation in the Emergency Department.    Greg Gómez DO  Attending Emergency  Physician                Greg Gómez DO  11/30/24 1378

## 2025-02-19 ENCOUNTER — OFFICE VISIT (OUTPATIENT)
Age: 60
End: 2025-02-19
Payer: MEDICARE

## 2025-02-19 VITALS
HEIGHT: 64 IN | TEMPERATURE: 98.1 F | HEART RATE: 102 BPM | OXYGEN SATURATION: 95 % | BODY MASS INDEX: 29.71 KG/M2 | WEIGHT: 174 LBS | SYSTOLIC BLOOD PRESSURE: 129 MMHG | DIASTOLIC BLOOD PRESSURE: 83 MMHG

## 2025-02-19 DIAGNOSIS — D23.5 DILATED PORE OF WINER OF BACK: ICD-10-CM

## 2025-02-19 DIAGNOSIS — L68.0 HIRSUTISM: ICD-10-CM

## 2025-02-19 DIAGNOSIS — L64.9 ANDROGENETIC ALOPECIA: Primary | ICD-10-CM

## 2025-02-19 DIAGNOSIS — Z79.899 ON POTASSIUM SPARING DIURETIC THERAPY: ICD-10-CM

## 2025-02-19 DIAGNOSIS — L82.0 INFLAMED SEBORRHEIC KERATOSIS: ICD-10-CM

## 2025-02-19 PROCEDURE — G8419 CALC BMI OUT NRM PARAM NOF/U: HCPCS | Performed by: DERMATOLOGY

## 2025-02-19 PROCEDURE — 1036F TOBACCO NON-USER: CPT | Performed by: DERMATOLOGY

## 2025-02-19 PROCEDURE — 3017F COLORECTAL CA SCREEN DOC REV: CPT | Performed by: DERMATOLOGY

## 2025-02-19 PROCEDURE — G8428 CUR MEDS NOT DOCUMENT: HCPCS | Performed by: DERMATOLOGY

## 2025-02-19 PROCEDURE — 99213 OFFICE O/P EST LOW 20 MIN: CPT | Performed by: DERMATOLOGY

## 2025-02-19 NOTE — PROGRESS NOTES
Dermatology Patient Note  [facility]  [facility] DERMATOLOGY  [address] [place] [address]  Dept: [phone]  Dept Fax: [phone]      VISITDATE: 2/19/2025   REFERRING PROVIDER: No ref. provider found      Caity Jones is a 59 y.o. female  who presents today in the office for:    Other (Patient presents today for a follow up of alopecia and dry skin on her nose. She also has a scaly raised spot on her right breast. Currently taking spironolactone and minoxidil. She is noticing improvent with the hair, not noticing as much hair on her pillows.)      HISTORY OF PRESENT ILLNESS:  As above. Patient presents for a 6 month follow up for alopecia and hirsutism. At the last visit, 8/19/24, 5 ISKs on the chest were treated with cryotherapy. She was started on topical Minoxidil 2% 1-2x daily and was continued on spironolactone 150 mg QD.     Today, patient has noticed improvement with topical minoxidil but does notice increased hair growth on her chin. She has previously had laser hair removal 3 treatments initially and 2 subsequent treatments with regrowth. She previously had a lesion biopsied on her right upper arm which is slightly tender to touch. Also has some irritated lesions of concern on her right breast and left hip.     MEDICAL PROBLEMS:  There are no problems to display for this patient.      CURRENT MEDICATIONS:   Current Outpatient Medications   Medication Sig Dispense Refill    spironolactone (ALDACTONE) 50 MG tablet TAKE THREE TABLETS BY MOUTH DAILY 90 tablet 2    ketoconazole (NIZORAL) 2 % shampoo APPLY 3-4 TIMES WEEKLY TO SCALP, LEAVE ON FOR 5 MINUTES PRIOR TO WASHING  mL 2    Semaglutide,0.25 or 0.5MG/DOS, (OZEMPIC, 0.25 OR 0.5 MG/DOSE,) 2 MG/1.5ML SOPN Inject into the skin      fluocinonide (LIDEX) 0.05 % external solution Apply to scalp daily 60 mL 2    lamoTRIgine (LAMICTAL) 25 MG tablet       atorvastatin (LIPITOR) 40 MG tablet       ALPRAZolam

## 2025-02-19 NOTE — PATIENT INSTRUCTIONS
Androgenetic Alopecia + Hirsutism, biopsy proven   Associated symptoms of face/scalp pruritus, likely seb derm  On potassium sparing therapy  - hair density has improved; she is interested in increasing density without worsening hirsutism   - failed Dutasteride (allergic rxn of hives/pruritus)   - continue spironolactone 150 mg QD; potassium lab normal 8/21/24   - continue topical Minoxidil 2% 1-2 daily. Counseled appropriate use. Will take up to 6 months to see results. May have initial telogen effluvium. Results will be reversed if use is discontinued.

## 2025-04-16 DIAGNOSIS — L21.9 SEBORRHEIC DERMATITIS: ICD-10-CM

## 2025-04-16 RX ORDER — KETOCONAZOLE 20 MG/ML
SHAMPOO, SUSPENSION TOPICAL
Qty: 120 ML | Refills: 2 | Status: SHIPPED | OUTPATIENT
Start: 2025-04-16

## 2025-08-21 ENCOUNTER — OFFICE VISIT (OUTPATIENT)
Age: 60
End: 2025-08-21

## 2025-08-21 VITALS
HEART RATE: 77 BPM | SYSTOLIC BLOOD PRESSURE: 110 MMHG | WEIGHT: 171.4 LBS | BODY MASS INDEX: 29.26 KG/M2 | TEMPERATURE: 98 F | DIASTOLIC BLOOD PRESSURE: 70 MMHG | OXYGEN SATURATION: 95 % | HEIGHT: 64 IN

## 2025-08-21 DIAGNOSIS — L21.9 SEBORRHEIC DERMATITIS: ICD-10-CM

## 2025-08-21 DIAGNOSIS — D22.9 MULTIPLE NEVI: Primary | ICD-10-CM

## 2025-08-21 DIAGNOSIS — L68.0 HIRSUTISM: ICD-10-CM

## 2025-08-21 DIAGNOSIS — L82.0 INFLAMED SEBORRHEIC KERATOSIS: ICD-10-CM

## 2025-08-21 DIAGNOSIS — L64.9 ANDROGENETIC ALOPECIA: ICD-10-CM

## 2025-08-21 DIAGNOSIS — Z79.899 ON POTASSIUM SPARING DIURETIC THERAPY: ICD-10-CM

## 2025-08-21 DIAGNOSIS — L82.1 SEBORRHEIC KERATOSES: ICD-10-CM

## 2025-08-21 DIAGNOSIS — L70.0 ACNE VULGARIS: ICD-10-CM

## 2025-08-21 RX ORDER — TRETINOIN 0.25 MG/G
CREAM TOPICAL
Qty: 45 G | Refills: 3 | Status: SHIPPED | OUTPATIENT
Start: 2025-08-21

## 2025-08-21 RX ORDER — KETOCONAZOLE 20 MG/G
CREAM TOPICAL
Qty: 30 G | Refills: 3 | Status: SHIPPED | OUTPATIENT
Start: 2025-08-21

## 2025-08-21 RX ORDER — KETOCONAZOLE 20 MG/ML
SHAMPOO, SUSPENSION TOPICAL
Qty: 120 ML | Refills: 2 | Status: SHIPPED | OUTPATIENT
Start: 2025-08-21